# Patient Record
Sex: MALE | URBAN - METROPOLITAN AREA
[De-identification: names, ages, dates, MRNs, and addresses within clinical notes are randomized per-mention and may not be internally consistent; named-entity substitution may affect disease eponyms.]

---

## 2024-05-23 ENCOUNTER — APPOINTMENT (OUTPATIENT)
Dept: URBAN - METROPOLITAN AREA SURGERY 19 | Age: 29
Setting detail: DERMATOLOGY
End: 2024-05-24

## 2024-05-23 DIAGNOSIS — L64.8 OTHER ANDROGENIC ALOPECIA: ICD-10-CM

## 2024-05-23 PROCEDURE — OTHER MIPS QUALITY: OTHER

## 2024-05-23 PROCEDURE — 99203 OFFICE O/P NEW LOW 30 MIN: CPT

## 2024-05-23 PROCEDURE — OTHER COUNSELING: OTHER

## 2024-05-23 PROCEDURE — OTHER SKIN MEDICINALS: OTHER

## 2024-05-23 NOTE — HPI: HAIR LOSS
Additional History: Patient has noticed more hairloss on the crown of the scalp and the temporal scalp. He says that his father and grandfather are bald and is hoping to take medication to stop the balding from progressing

## 2024-05-23 NOTE — PROCEDURE: SKIN MEDICINALS
Sig: Use as directed when washing hair
Sig: Apply to affected areas twice daily
Sig: Apply pea sized amount to face every other night (rx for 30 g, 2 RF).
Sig: Apply a thin layer to the affected nails daily
Sig: Apply nightly to warts nightly under occlusion
Sig: Take one pill daily
Sig: Take one twice daily
Sig: Apply pea sized amount per area at night
Sig: Apply qAM (rx for 30 g, 2 RF).
Sig: Apply to affected areas on face twice daily
Sig: Apply a thin layer to the areas with decreased hair density 1-2 times daily
Sig: Take one pill twice daily
Sig: Apply twice daily for 5 days
Sig: Apply a thin layer to the affected areas daily
Sig: Apply a thin layer to the itching areas twice daily as needed
Sig: Apply a thin layer to the affected areas twice daily (rx for 240 g, 2 RF)
Sig: Apply nightly to wart(s) under Band-aid occlusion (15 g, 1 RF).
Sig: Apply a thin layer to the affected areas twice daily
Sig: Take one pill daily (#90, 1 RF)
Sig: Apply a thin layer to the affected skin twice daily
Sig: Apply a thin layer to the scar daily
Sig: Wash affected areas daily.
Sig: Apply twice daily for 5 days (rx for 30 g, 0 RF).
Sig: Apply a thin layer to the itching areas twice daily as needed (60 g, 2 RF)
Detail Level: Zone
Sig: Apply pea sized amount per area at night; apply for 2 months, take 1 month off, then repeat (rx for 30 g, 1 RF).
Sig: Apply a thin layer to the affected skin twice daily (rx for 30 g, 1 RF).
Product Type (1): HAIRSTIM
Sig: Apply to the affected skin twice daily
Sig: Apply to affected areas twice daily (rx for 60 mL, 1 RF)
[facility] Medicines Prescription 1: Minoxidil 2.5mg Tablets
Sig: Apply a thin layer to the areas with decreased hair density daily (e-rx for 30 mL, 2 RF)